# Patient Record
(demographics unavailable — no encounter records)

---

## 2024-10-22 NOTE — ASSESSMENT
[FreeTextEntry1] : This is a 74-year-old female patient, with PMHx of JAK2 Myeloproliferative disease, complicated by dural VT, PE, and Portal VT, seizures (on Keppra), CKD3 and HTN, who is presenting for a 10-week follow-up after doing sleep studies.  #Mild JAMIE - patient reports persistent snoring, daytime sleepiness, fatigue - no witnessed apnea episodes - uses Albuterol 2 times/week - sleep study showed mild JAMIE - will order CPAP machine and f/u in 3 months to assess compliance  #Cough - Resolved - resolved after Zyrtec and PPI - patient still on PPI  RTC in 3 months to assess compliance and improvement with CPAP machine.

## 2024-10-22 NOTE — REASON FOR VISIT
[Follow-Up] : a follow-up visit [Sleep Apnea] : sleep apnea [Family Member] : family member [TextBox_44] : Snoring, daytime sleepiness, fatigue, cough

## 2024-10-22 NOTE — REVIEW OF SYSTEMS
[Cough] : cough [A.M. Dry Mouth] : a.m. dry mouth [GERD] : gerd [Negative] : Endocrine [TextBox_30] : snoring at night

## 2024-10-22 NOTE — HISTORY OF PRESENT ILLNESS
[Former] : former [>= 20 pack years] : >= 20 pack years [Snoring] : snoring [TextBox_4] : This is a 74-year-old female patient, with PMHx of JAK2 Myeloproliferative disease, complicated by dural VT, PE, and Portal VT, seizures (on Keppra), CKD3 and HTN, who is presenting for follow-up for sleep study results. Patient reports persistent snoring at night, daytime sleepiness and fatigue. Her cough has significantly improved with Zyrtec and PPI. She still takes albuterol inhaler 1-2 times per week. Former smoker, quit more than 30 years ago. Patient lost 3 pounds since last visit. Denies any other respiratory symptoms, chest pain, palpitations.  [YearQuit] : 2009

## 2024-10-31 NOTE — HISTORY OF PRESENT ILLNESS
[FreeTextEntry1] : 73 yo F with PMHx of HTN, Pre DM, Myeloproliferative disorder, PE, DVT, PVD presenting for her annual physical. patient reports that she feel generally well. No complaints She is accompanied by her Daughter. patient needs assistance in iADLs. Her daughter does grocery shopping, cook and prepare meds for the patient. Confirmed with patient that the daughter is the healthcare proxy for which she has the paperwork. When asked about memory the daughter states that the patient is forgetful and that is why she spends most of the time at her apartment.  Patient wants to get a flu shot at this visit and wants her meds renewed. [Independent] : managing finances [] : Assistance needed with traveling/transport [Full assistance needed] : Assistance needed managing medications [Patient/Caregiver unclear of wishes] : , patient/caregiver unclear of wishes [Designated Healthcare Proxy] : Designated healthcare proxy [Relationship: ___] : Relationship: [unfilled] [AdvancecareDate] : 10/30/24

## 2024-10-31 NOTE — PHYSICAL EXAM
[Alert] : alert [No Acute Distress] : in no acute distress [Sclera] : the sclera and conjunctiva were normal [Normal Outer Ear/Nose] : the ears and nose were normal in appearance [Supple] : the neck was supple [No Respiratory Distress] : no respiratory distress [Auscultation Breath Sounds / Voice Sounds] : lungs were clear to auscultation bilaterally [Normal S1, S2] : normal S1 and S2 [Heart Rate And Rhythm] : heart rate was normal and rhythm regular [Edema] : edema was not present [Pedal Pulses Normal] : the pedal pulses are present [Bowel Sounds] : normal bowel sounds [Abdomen Tenderness] : non-tender [Abdomen Soft] : soft [No Spinal Tenderness] : no spinal tenderness [Involuntary Movements] : no involuntary movements were seen [Motor Tone] : muscle strength and tone were normal [No Focal Deficits] : no focal deficits [Oriented To Time, Place, And Person] : oriented to person, place, and time [de-identified] : impaired short-term memory

## 2024-10-31 NOTE — HISTORY OF PRESENT ILLNESS
[FreeTextEntry1] : 75 yo F with PMHx of HTN, Pre DM, Myeloproliferative disorder, PE, DVT, PVD presenting for her annual physical. patient reports that she feel generally well. No complaints She is accompanied by her Daughter. patient needs assistance in iADLs. Her daughter does grocery shopping, cook and prepare meds for the patient. Confirmed with patient that the daughter is the healthcare proxy for which she has the paperwork. When asked about memory the daughter states that the patient is forgetful and that is why she spends most of the time at her apartment.  Patient wants to get a flu shot at this visit and wants her meds renewed. [Independent] : managing finances [] : Assistance needed with traveling/transport [Full assistance needed] : Assistance needed managing medications [Patient/Caregiver unclear of wishes] : , patient/caregiver unclear of wishes [Designated Healthcare Proxy] : Designated healthcare proxy [Relationship: ___] : Relationship: [unfilled] [AdvancecareDate] : 10/30/24

## 2024-10-31 NOTE — ASSESSMENT
[FreeTextEntry1] : #Snoring #JAMIE - seen by pulm - BMI 33 - weight loss advised - pt awaiting CPAP  #HTN - daughter prepares food for pt and admits to using salt  - BP better compared to prior visit - c/w losartan 50mg in AM and 25mg in PM - c/w  HCTz  # Myeloproliferative disorder with dural vein thrombosis, PE and DVT #Macrocytosis - follows with Hematology visit - c/w Hydrea 1500 mg MWF and 1000 mg daily on other days  - Continue Coumadin, follows in coumadin clinic monthly - patient prefers coumadin over eliquis  #GERD - denies any symptoms - C/w omeprazole  #memory impairment  - pt needs assistance in iADLs - pt is cared for by her daughter who helps her in iADLs  #HCM - Mammogram 8/24/2023, birads 1- referral given - Colonoscopy 11/2023 multiple polyps, repeat in 3 years - Seen by GYN on 11/2023 - Flu shot 10/30/24 - Meds renewed RTC in 6 months

## 2024-10-31 NOTE — PHYSICAL EXAM
[Alert] : alert [No Acute Distress] : in no acute distress [Sclera] : the sclera and conjunctiva were normal [Normal Outer Ear/Nose] : the ears and nose were normal in appearance [Supple] : the neck was supple [No Respiratory Distress] : no respiratory distress [Auscultation Breath Sounds / Voice Sounds] : lungs were clear to auscultation bilaterally [Normal S1, S2] : normal S1 and S2 [Heart Rate And Rhythm] : heart rate was normal and rhythm regular [Edema] : edema was not present [Pedal Pulses Normal] : the pedal pulses are present [Bowel Sounds] : normal bowel sounds [Abdomen Tenderness] : non-tender [Abdomen Soft] : soft [No Spinal Tenderness] : no spinal tenderness [Involuntary Movements] : no involuntary movements were seen [Motor Tone] : muscle strength and tone were normal [No Focal Deficits] : no focal deficits [Oriented To Time, Place, And Person] : oriented to person, place, and time [de-identified] : impaired short-term memory

## 2024-12-18 NOTE — PHYSICAL EXAM
[Restricted in physically strenuous activity but ambulatory and able to carry out work of a light or sedentary nature] : Status 1- Restricted in physically strenuous activity but ambulatory and able to carry out work of a light or sedentary nature, e.g., light house work, office work [Obese] : obese [Normal] : affect appropriate [de-identified] : ambulating fine without a cane or walker

## 2024-12-18 NOTE — REASON FOR VISIT
[Follow-Up Visit] : a follow-up visit for [Myeloproliferative Disorder] : myeloproliferative disorder [FreeTextEntry2] : Dural Vein Thrombosis in setting of Jak2 Positive Myeloproliferative disorder

## 2024-12-18 NOTE — PHYSICAL EXAM
[Restricted in physically strenuous activity but ambulatory and able to carry out work of a light or sedentary nature] : Status 1- Restricted in physically strenuous activity but ambulatory and able to carry out work of a light or sedentary nature, e.g., light house work, office work [Obese] : obese [Normal] : affect appropriate [de-identified] : ambulating fine without a cane or walker

## 2024-12-18 NOTE — HISTORY OF PRESENT ILLNESS
[de-identified] : This is a very pleasant 66-year-old female who presents accompanied with her daughter Anyi Herrera  and gives some of the history. She has a history of portal vein thrombosis possibly provoked by intra-abdominal abscess for which she was treated in [facility] in 2011 although isn't unclear if indeed it was due to an infection of spontaneous. She states she finished 6 months of Coumadin and has been observed.  In September 26, 2016 she presented to [facility] hospital because of headaches and right-sided weakness. Workup that included a MRI of the brain on September 27 demonstrated Bilateral parieto-occipital lobe dural based masses along the posterior cerebral falx which may represent partially calcified meningiomas. Scattered T2 and FLAIR hyperintensities bilateral frontal and parietal periventricular and subcortical white matter which are nonspecific and without mass effect most likely consistent with chronic small vessel ischemic changes. No acute infarcts. She didn't want MRI brain and MR of the brain with contrast demonstrated A filling defect in the superior lobe sagittal sinus with deep extension to the tributary veins and the right transverse sinus. Deep venous system is patent. edema within the bilateral parietal lobes near the vertex with subarachnoid hemorrhage and intraparenchymal hemorrhage. There is mild leptomeningeal enhancement without nodular parenchymal enhancement. She was started on systemic anticoagulation and position to Coumadin during hospital course. She also had a seizure and was started on Keppra.  A hypercoagulable workup was done and demonstrated She was checked to positive. Her initial blood work on September 26, 2016 demonstrate whether to count of 10.8 hemoglobin of 15.2 and platelet count of 382,000 Without significant findings on differential.  She now presents for an elevation with her daughter. Since the episode she now lives with her daughter before she is completely independent. She does walk around at home but does require a walker because of balance issues as well as some residual weakness in the right side. She does have occasional dizziness. The headache that she initially had resolved. She continues to be in Coumadin 6 mg daily with last INR was therapeutic on January 3, 2016 when she presented to ED because she did not have Coumadin. Interestingly her platelet count and that CBC was elevated at 520,000. [de-identified] : 7/20/17 Last blood work had no elevations in Whites, reds or plts. Walking better. No bleeding. 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 No signal flow enhancement again noted in the confluence of sinuses extending to the right transverse sinus, sigmoid sinus and right internal jugular vein as well as slight irregularity of the superior sagittal sinus with decreased caliber and filling defect consistent with slow flow and previously described dural sinus thrombosis involving the superior sagittal sinus with extension to the right transverse sinus. She refused to have a bone marrow biopsy.  12/4/18 She is here for follow up. She is here with her sister. She started Hydrea in late October. She now has nail changes and darker skin with dry skin. She did not want to have pyelotomy. Most recent CBC shows HCT of about 55% with normal platelet and wbc. After a conversation we decided to increase Hydrea from 1000 mg daily to 1500 mg daily.   1/9/19 Patient is here for a follow-up visit with her home attendant. She is feeling well with no complaints. Most recent CBC shows decrease in her hemoglobin on 1500mg Hydrea daily, WBC are mildly decreased at 3.62, ANC is 2.18. The only side effect she reports is hyperpigmentation of her hands. She is still taking the Coumadin. Patient denies fever, chills, nausea, vomiting.  10/29/19 Pt presented today for routine f/u with her daughter. Reports feeling fine, missed many appointments and was last seen > 6 months ago. Reports that she continues taking Hydrea during that time and is also taking Coumadin with careful monitoring of INR, after last visit in January, she was recommended to take 1500 mg of Hydrea 3 days a week and a 1000 mg on other days. She has no new complaints except pain in her left hip that is more on lying down on that side and feels a little discomfort on ambulation. Denies any fevers, chills, swelling of legs, sob, blurry vision or any other symptoms.   2/26/20 She is here for follow up. She looks good. CBC is at goal no change needed.  3/8/21 She came back for follow up. She has been in hiatus due to COVID. She is doing well. Ambulates well. CBC is at goal from today and continues with Coumadin.   10/6/21 She is here for follow up. CBC from today is normal with stable macrocytosis. Most recent INR is 2. She has some back pain otherwise doing well. Uses a cane.  5/16/22 Pt returns for f/u today with her aid. She reports feeling fine except the headaches that have recently started. She reports having a dull headache on right side, tyelenol helps her but she is hesitant to take it. Denies any vision changes or difficulty in ambulating. Reports that this headache is different then the one she had in past. She is now ambulating without a walker or cane. Continues to take Hydrea as recommended and follows up coumadin center for monitoring of INR.  06/09/2023 She is here for follow up. She had Diverticulitis CTs from may review repeat had no abscess and has been on IV and PO antibiotics with variable INR follows in coumadin clinic. Otherwise doing well here with daughter and has no complaints.  12/11/2023 She is here for follow up. She was just in [facility] for a LEEP and colonoscopy and was on heparin rip than Lovenox to coumadin bridge. INR on 12/8 was at goal. Hypepalstic polyp on path. Cervical tissue at transformation zone showing focal superficial ulcer in background of low-grade squamous intraepithelial lesion, and mild chronic inflammation with nabothian cysts. No histologic evidence of high-grade squamous intraepithelial lesion identified. 2.  Endocervical curettings: -Benign endocervical tissue showing chronic inflammation and mucus material. CBC on 12/7 was at goal kidney function was stable as well. She is here with her sister. She feels well. We spoke about DOACS but decided to stay on coumadin.  06/13/2024 accompanied by her daughter; Reports feeling well at the baseline of her health status, no changes in chronic conditions or new complaints since the last visit. Reports INR is under control.  12/18/24 She is here for follow up.  She had a recent Cologuard test.  in August 2024 that was negative.  Most recent CBC was demonstrated hematocrit of 47.9% platelets and white cells were normal this was in April.  In April her creatinine was 1.6 with GFR of 34 this is relatively stable since May 2022 although repeat creatinine was granted it was 1.4 than Hct today is  46.6

## 2024-12-18 NOTE — END OF VISIT
[FreeTextEntry3] : I was physically present for the key portions of the evaluation and management service provided.  I agree with the history and physical, and plan which I have reviewed and edited where appropriate.  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  She remains on Hydrea and Coumadin we did speak about switching to DOAC's but decided to hold off now.  CBC is stable she will see us back in 6 months

## 2024-12-18 NOTE — REVIEW OF SYSTEMS
[Joint Pain] : joint pain [Negative] : Allergic/Immunologic [FreeTextEntry9] : Left hip pain - uses cane

## 2024-12-18 NOTE — ASSESSMENT
[FreeTextEntry1] : # Portal vein thrombosis - Dural Vein Thrombosis and other venous events. due to Pvera - in 2011 unclear if it was due to intra-abdominal infection or unprovoked, Resulting in Splenomegaly with gastroesophageal perihepatic and perigastric varices, were noted suggestive of sequela with portal hypertension and possible portal vein occlusion and recanalization, as well as DVT and pulmonary embolism for which she was on Coumadin for 6 months who presented with severe headaches right-sided weakness and was found to have Dural vein thrombosis and seizure with hypercoagulable workup Demonstrated judith 2 positivity Suspicious for myeloproliferative disorder. - Etiology likely due myeloproliferative disorder given the Jak2 Positivity and developed sustained Erythrocytosis which resolved with Hydrea. - she refused bone marrow biopsy. - 05/2023 CT - Splenomegaly measuring up to 16 cm in AP dimension with few wedge-shaped peripheral hypodense foci, unchanged since July 19, 2018 worsening inflammatory changes of acute sigmoid diverticulitis without evidence for abscess formation or intraperitoneal free air. - 11/2023 colonoscopy. 2024 negative Cologuard   # Seizures possibly related to #1 on Keppra in past - no new events reported.   #Has CKD  PLAN: - continue Hydrea 1500 mg MWF and 1000 mg all other days.  Since hematocrit is still elevated decided to increase Hydrea to 1500 mg Monday through Friday and 1000 mg Saturday and Sunday.  She takes her medication as directed MCV is elevated she does have some craving for ice -goal HCT is <42% - continue indefinite anticoagulation with Coumadin unless bleeding becomes an issues - being followed by Coumadin clinic goal INR is 2-3. - she continues to decline Bone Marrow Biopsy. - F/U GI for portal hypertension and gastroesophageal perihepatic and perigastric varices. - Labs today: CBC RTC 1 month we will check CBC CMP and iron studies since been craving more ice and we will go from there

## 2024-12-18 NOTE — ASSESSMENT
[Verbal] : verbal [Patient] : patient [Good - alert, interested, motivated] : Good - alert, interested, motivated [Demonstrates independently] : demonstrates independently [FreeTextEntry1] : Plan: -Pt seen & evaluated -Patient has pain about the toes secondary to nail pressure and relates improvement with periodic debridement -Aseptic debridement of all fungal nails 1-10 performed w/sterile nail nipper to normotrophic thickness & length -Continue ciclopirox 8% for fungal nails. -RTC 3 months

## 2024-12-18 NOTE — PHYSICAL EXAM
[General Appearance - Alert] : alert [General Appearance - In No Acute Distress] : in no acute distress [Ankle Swelling Bilaterally] : bilaterally  [2+] : left foot dorsalis pedis 2+ [Pes Planus] : pes planus deformity [Sensation] : the sensory exam was normal to light touch and pinprick [Ankle Swelling (On Exam)] : not present [Varicose Veins Of Lower Extremities] : not present [] : not present [Delayed in the Right Toes] : capillary refills normal in right toes [Delayed in the Left Toes] : capillary refills normal in the left toes [Foot Ulcer] : no foot ulcer [Skin Induration] : no skin induration [FreeTextEntry1] : Elongated mycotic nails x10 Mild xerosis to plantar feet

## 2024-12-18 NOTE — HISTORY OF PRESENT ILLNESS
[de-identified] : This is a very pleasant 66-year-old female who presents accompanied with her daughter Anyi Herrera  and gives some of the history. She has a history of portal vein thrombosis possibly provoked by intra-abdominal abscess for which she was treated in [facility] in 2011 although isn't unclear if indeed it was due to an infection of spontaneous. She states she finished 6 months of Coumadin and has been observed.  In September 26, 2016 she presented to [facility] because of headaches and right-sided weakness. Workup that included a MRI of the brain on September 27 demonstrated Bilateral parieto-occipital lobe dural based masses along the posterior cerebral falx which may represent partially calcified meningiomas. Scattered T2 and FLAIR hyperintensities bilateral frontal and parietal periventricular and subcortical white matter which are nonspecific and without mass effect most likely consistent with chronic small vessel ischemic changes. No acute infarcts. She didn't want MRI brain and MR of the brain with contrast demonstrated A filling defect in the superior lobe sagittal sinus with deep extension to the tributary veins and the right transverse sinus. Deep venous system is patent. edema within the bilateral parietal lobes near the vertex with subarachnoid hemorrhage and intraparenchymal hemorrhage. There is mild leptomeningeal enhancement without nodular parenchymal enhancement. She was started on systemic anticoagulation and position to Coumadin during hospital course. She also had a seizure and was started on Keppra.  A hypercoagulable workup was done and demonstrated She was checked to positive. Her initial blood work on September 26, 2016 demonstrate whether to count of 10.8 hemoglobin of 15.2 and platelet count of 382,000 Without significant findings on differential.  She now presents for an elevation with her daughter. Since the episode she now lives with her daughter before she is completely independent. She does walk around at home but does require a walker because of balance issues as well as some residual weakness in the right side. She does have occasional dizziness. The headache that she initially had resolved. She continues to be in Coumadin 6 mg daily with last INR was therapeutic on January 3, 2016 when she presented to ED because she did not have Coumadin. Interestingly her platelet count and that CBC was elevated at 520,000. [de-identified] : 7/20/17 Last blood work had no elevations in Whites, reds or plts. Walking better. No bleeding. 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 No signal flow enhancement again noted in the confluence of sinuses extending to the right transverse sinus, sigmoid sinus and right internal jugular vein as well as slight irregularity of the superior sagittal sinus with decreased caliber and filling defect consistent with slow flow and previously described dural sinus thrombosis involving the superior sagittal sinus with extension to the right transverse sinus. She refused to have a bone marrow biopsy.  12/4/18 She is here for follow up. She is here with her sister. She started Hydrea in late October. She now has nail changes and darker skin with dry skin. She did not want to have pyelotomy. Most recent CBC shows HCT of about 55% with normal platelet and wbc. After a conversation we decided to increase Hydrea from 1000 mg daily to 1500 mg daily.   1/9/19 Patient is here for a follow-up visit with her home attendant. She is feeling well with no complaints. Most recent CBC shows decrease in her hemoglobin on 1500mg Hydrea daily, WBC are mildly decreased at 3.62, ANC is 2.18. The only side effect she reports is hyperpigmentation of her hands. She is still taking the Coumadin. Patient denies fever, chills, nausea, vomiting.  10/29/19 Pt presented today for routine f/u with her daughter. Reports feeling fine, missed many appointments and was last seen > 6 months ago. Reports that she continues taking Hydrea during that time and is also taking Coumadin with careful monitoring of INR, after last visit in January, she was recommended to take 1500 mg of Hydrea 3 days a week and a 1000 mg on other days. She has no new complaints except pain in her left hip that is more on lying down on that side and feels a little discomfort on ambulation. Denies any fevers, chills, swelling of legs, sob, blurry vision or any other symptoms.   2/26/20 She is here for follow up. She looks good. CBC is at goal no change needed.  3/8/21 She came back for follow up. She has been in hiatus due to COVID. She is doing well. Ambulates well. CBC is at goal from today and continues with Coumadin.   10/6/21 She is here for follow up. CBC from today is normal with stable macrocytosis. Most recent INR is 2. She has some back pain otherwise doing well. Uses a cane.  5/16/22 Pt returns for f/u today with her aid. She reports feeling fine except the headaches that have recently started. She reports having a dull headache on right side, tyelenol helps her but she is hesitant to take it. Denies any vision changes or difficulty in ambulating. Reports that this headache is different then the one she had in past. She is now ambulating without a walker or cane. Continues to take Hydrea as recommended and follows up coumadin center for monitoring of INR.  06/09/2023 She is here for follow up. She had Diverticulitis CTs from may review repeat had no abscess and has been on IV and PO antibiotics with variable INR follows in coumadin clinic. Otherwise doing well here with daughter and has no complaints.  12/11/2023 She is here for follow up. She was just in [facility] for a LEEP and colonoscopy and was on heparin rip than Lovenox to coumadin bridge. INR on 12/8 was at goal. Hypepalstic polyp on path. Cervical tissue at transformation zone showing focal superficial ulcer in background of low-grade squamous intraepithelial lesion, and mild chronic inflammation with nabothian cysts. No histologic evidence of high-grade squamous intraepithelial lesion identified. 2.  Endocervical curettings: -Benign endocervical tissue showing chronic inflammation and mucus material. CBC on 12/7 was at goal kidney function was stable as well. She is here with her sister. She feels well. We spoke about DOACS but decided to stay on coumadin.  06/13/2024 accompanied by her daughter; Reports feeling well at the baseline of her health status, no changes in chronic conditions or new complaints since the last visit. Reports INR is under control.  12/18/24 She is here for follow up.  She had a recent Cologuard test.  in August 2024 that was negative.  Most recent CBC was demonstrated hematocrit of 47.9% platelets and white cells were normal this was in April.  In April her creatinine was 1.6 with GFR of 34 this is relatively stable since May 2022 although repeat creatinine was granted it was 1.4 than Hct today is  46.6

## 2024-12-18 NOTE — HISTORY OF PRESENT ILLNESS
[Other: ____] : [unfilled] [FreeTextEntry1] : -74F returns for med management of nail dystrophy  -Has been applying ciclopirox daily with improvement noted -Prediabetic -Denies numbness and tingling

## 2024-12-18 NOTE — BEGINNING OF VISIT
[0] : 2) Feeling down, depressed, or hopeless: Not at all (0) [PHQ-2 Negative] : PHQ-2 Negative [Pain Scale: ___] : On a scale of 1-10, today the patient's pain is a(n) [unfilled]. [Former] : Former [Date Discussed (MM/DD/YY): ___] : Discussed: [unfilled] [Patient/Caregiver not ready to engage] : Patient/Caregiver not ready to engage [Abdominal Pain] : no abdominal pain [Vomiting] : no vomiting [Constipation] : no constipation

## 2025-01-21 NOTE — ASSESSMENT
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 Lindquist, neuropathy, seizure disorder, peripheral vascular disease and hearing loss is presenting for an initial evaluation of CKD 3b. Patient's most recent BUN/Cr is 28/1.6 from 4/2024. eGFR 34, Pr/Cr 0.6 in 4/24 .  #CKD 3b/4 likely 2/2 hypercoagulable state vs HTN - GFR 34 on CMP and 27 with cystatin-c in april 2024, was 41 and 29 previously - cr 1.6 in april 24, was 1.36 and 1.8 previously - prot/creat ratio 0.6 and albumin to creat ratio 243 - pt did not ge any labs this visit either - Repeat labs before next visit - polyuria, did not get US renal bladder - C/w losartan to 50mg in Am and 25mg in pm - avoid nephrotoxic agents  #HTN - /80 in office today - c/w HCTZ 25mg qd  #preDM - A1c 5.8 april 24, stable - diet and lifestyle modification advised  - Pt taking losartan 50mg in the am and 25mg in the pm RTC in 3-4 months with A1c, CMP, CBC, B12, vit d, phos, pth, UA and reflex Ucx, US renal

## 2025-01-21 NOTE — END OF VISIT
[] : Resident [FreeTextEntry3] : Patient seen and examined with resident, chart reviewed.  No uremic symptoms noted on encounter today, does not some incontinence of her urine, has not yet performed the RBUS.  No new labwork available for review today.  BP controlled on encounter with adjustment in Losartan dose.  Advised patient to performed assigned labwork in 1-2 months in addition to her renal imaging, and to RTC in 3 months for further assessment.   [Time Spent: ___ minutes] : I have spent [unfilled] minutes of time on the encounter which excludes teaching and separately reported services.

## 2025-01-21 NOTE — HISTORY OF PRESENT ILLNESS
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 The patient has chronic kidney disease stage 3 and the etiology is undetermined. The patient is experiencing the following symptoms: polyuria and edema. Current Treatment includes angiotensin receptor blocker and diuretics.  By report, patient demonstrates good compliance, good tolerance and fair symptom control with treatment.  The last visit was in Aug 2024 and US renal bladder was ordered which was not completed on this visit. Repeat labs were ordered on the last visit.

## 2025-01-21 NOTE — PHYSICAL EXAM
[General Appearance - Alert] : alert [General Appearance - In No Acute Distress] : in no acute distress [Sclera] : the sclera and conjunctiva were normal [PERRL With Normal Accommodation] : pupils were equal in size, round, and reactive to light [Extraocular Movements] : extraocular movements were intact [Outer Ear] : the ears and nose were normal in appearance [Oropharynx] : the oropharynx was normal [Neck Appearance] : the appearance of the neck was normal [Neck Cervical Mass (___cm)] : no neck mass was observed [Jugular Venous Distention Increased] : there was no jugular-venous distention [Thyroid Diffuse Enlargement] : the thyroid was not enlarged [Thyroid Nodule] : there were no palpable thyroid nodules [Auscultation Breath Sounds / Voice Sounds] : lungs were clear to auscultation bilaterally [Heart Rate And Rhythm] : heart rate was normal and rhythm regular [Heart Sounds] : normal S1 and S2 [Heart Sounds Gallop] : no gallops [Murmurs] : no murmurs [Heart Sounds Pericardial Friction Rub] : no pericardial rub [Full Pulse] : the pedal pulses are present [Edema] : there was no peripheral edema [Bowel Sounds] : normal bowel sounds [Abdomen Soft] : soft [Abdomen Tenderness] : non-tender [Abdomen Mass (___ Cm)] : no abdominal mass palpated [Abnormal Walk] : normal gait [Nail Clubbing] : no clubbing  or cyanosis of the fingernails [Musculoskeletal - Swelling] : no joint swelling seen [Motor Tone] : muscle strength and tone were normal [Skin Color & Pigmentation] : normal skin color and pigmentation [Skin Turgor] : normal skin turgor [] : no rash [Oriented To Time, Place, And Person] : oriented to person, place, and time [Impaired Insight] : insight and judgment were intact [Affect] : the affect was normal

## 2025-01-23 NOTE — ASSESSMENT
[FreeTextEntry1] : # Portal vein thrombosis - Dural Vein Thrombosis and other venous events. due to Pvera - in 2011 unclear if it was due to intra-abdominal infection or unprovoked, Resulting in Splenomegaly with gastroesophageal perihepatic and perigastric varices, were noted suggestive of sequela with portal hypertension and possible portal vein occlusion and recanalization, as well as DVT and pulmonary embolism for which she was on Coumadin for 6 months who presented with severe headaches right-sided weakness and was found to have Dural vein thrombosis and seizure with hypercoagulable workup Demonstrated judith 2 positivity Suspicious for myeloproliferative disorder. - Etiology likely due myeloproliferative disorder given the Jak2 Positivity and developed sustained Erythrocytosis which resolved with Hydrea. - she refused bone marrow biopsy. - 05/2023 CT - Splenomegaly measuring up to 16 cm in AP dimension with few wedge-shaped peripheral hypodense foci, unchanged since July 19, 2018 worsening inflammatory changes of acute sigmoid diverticulitis without evidence for abscess formation or intraperitoneal free air. - 11/2023 colonoscopy. 2024 negative Cologuard   # Seizures possibly related to #1 on Keppra in past - no new events reported.   #Has CKD  PLAN: -on Hydrea to 1500 mg Monday through Friday and 1000 mg Saturday and Sunday.  She takes her medication as directed MCV is elevated she does have some craving for ice -goal HCT is <42%  HCT was still 45% so will increase Hydrea to 1500 mg daily - continue indefinite anticoagulation with Coumadin unless bleeding becomes an issues - being followed by Coumadin clinic goal INR is 2-3. - she continues to decline Bone Marrow Biopsy. - F/U GI for portal hypertension and gastroesophageal perihepatic and perigastric varices. - Labs today: CBC RTC  2 months for follow up.

## 2025-01-23 NOTE — HISTORY OF PRESENT ILLNESS
[de-identified] : This is a very pleasant 66-year-old female who presents accompanied with her daughter Anyi Herrera  and gives some of the history. She has a history of portal vein thrombosis possibly provoked by intra-abdominal abscess for which she was treated in [facility] in 2011 although isn't unclear if indeed it was due to an infection of spontaneous. She states she finished 6 months of Coumadin and has been observed.  In September 26, 2016 she presented to [facility] hospital because of headaches and right-sided weakness. Workup that included a MRI of the brain on September 27 demonstrated Bilateral parieto-occipital lobe dural based masses along the posterior cerebral falx which may represent partially calcified meningiomas. Scattered T2 and FLAIR hyperintensities bilateral frontal and parietal periventricular and subcortical white matter which are nonspecific and without mass effect most likely consistent with chronic small vessel ischemic changes. No acute infarcts. She didn't want MRI brain and MR of the brain with contrast demonstrated A filling defect in the superior lobe sagittal sinus with deep extension to the tributary veins and the right transverse sinus. Deep venous system is patent. edema within the bilateral parietal lobes near the vertex with subarachnoid hemorrhage and intraparenchymal hemorrhage. There is mild leptomeningeal enhancement without nodular parenchymal enhancement. She was started on systemic anticoagulation and position to Coumadin during hospital course. She also had a seizure and was started on Keppra.  A hypercoagulable workup was done and demonstrated She was checked to positive. Her initial blood work on September 26, 2016 demonstrate whether to count of 10.8 hemoglobin of 15.2 and platelet count of 382,000 Without significant findings on differential.  She now presents for an elevation with her daughter. Since the episode she now lives with her daughter before she is completely independent. She does walk around at home but does require a walker because of balance issues as well as some residual weakness in the right side. She does have occasional dizziness. The headache that she initially had resolved. She continues to be in Coumadin 6 mg daily with last INR was therapeutic on January 3, 2016 when she presented to ED because she did not have Coumadin. Interestingly her platelet count and that CBC was elevated at 520,000. [de-identified] : 7/20/17 Last blood work had no elevations in Whites, reds or plts. Walking better. No bleeding. 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 No signal flow enhancement again noted in the confluence of sinuses extending to the right transverse sinus, sigmoid sinus and right internal jugular vein as well as slight irregularity of the superior sagittal sinus with decreased caliber and filling defect consistent with slow flow and previously described dural sinus thrombosis involving the superior sagittal sinus with extension to the right transverse sinus. She refused to have a bone marrow biopsy.  12/4/18 She is here for follow up. She is here with her sister. She started Hydrea in late October. She now has nail changes and darker skin with dry skin. She did not want to have pyelotomy. Most recent CBC shows HCT of about 55% with normal platelet and wbc. After a conversation we decided to increase Hydrea from 1000 mg daily to 1500 mg daily.   1/9/19 Patient is here for a follow-up visit with her home attendant. She is feeling well with no complaints. Most recent CBC shows decrease in her hemoglobin on 1500mg Hydrea daily, WBC are mildly decreased at 3.62, ANC is 2.18. The only side effect she reports is hyperpigmentation of her hands. She is still taking the Coumadin. Patient denies fever, chills, nausea, vomiting.  10/29/19 Pt presented today for routine f/u with her daughter. Reports feeling fine, missed many appointments and was last seen > 6 months ago. Reports that she continues taking Hydrea during that time and is also taking Coumadin with careful monitoring of INR, after last visit in January, she was recommended to take 1500 mg of Hydrea 3 days a week and a 1000 mg on other days. She has no new complaints except pain in her left hip that is more on lying down on that side and feels a little discomfort on ambulation. Denies any fevers, chills, swelling of legs, sob, blurry vision or any other symptoms.   2/26/20 She is here for follow up. She looks good. CBC is at goal no change needed.  3/8/21 She came back for follow up. She has been in hiatus due to COVID. She is doing well. Ambulates well. CBC is at goal from today and continues with Coumadin.   10/6/21 She is here for follow up. CBC from today is normal with stable macrocytosis. Most recent INR is 2. She has some back pain otherwise doing well. Uses a cane.  5/16/22 Pt returns for f/u today with her aid. She reports feeling fine except the headaches that have recently started. She reports having a dull headache on right side, tyelenol helps her but she is hesitant to take it. Denies any vision changes or difficulty in ambulating. Reports that this headache is different then the one she had in past. She is now ambulating without a walker or cane. Continues to take Hydrea as recommended and follows up coumadin center for monitoring of INR.  06/09/2023 She is here for follow up. She had Diverticulitis CTs from may review repeat had no abscess and has been on IV and PO antibiotics with variable INR follows in coumadin clinic. Otherwise doing well here with daughter and has no complaints.  12/11/2023 She is here for follow up. She was just in [facility] for a LEEP and colonoscopy and was on heparin rip than Lovenox to coumadin bridge. INR on 12/8 was at goal. Hypepalstic polyp on path. Cervical tissue at transformation zone showing focal superficial ulcer in background of low-grade squamous intraepithelial lesion, and mild chronic inflammation with nabothian cysts. No histologic evidence of high-grade squamous intraepithelial lesion identified. 2.  Endocervical curettings: -Benign endocervical tissue showing chronic inflammation and mucus material. CBC on 12/7 was at goal kidney function was stable as well. She is here with her sister. She feels well. We spoke about DOACS but decided to stay on coumadin.  06/13/2024 accompanied by her daughter; Reports feeling well at the baseline of her health status, no changes in chronic conditions or new complaints since the last visit. Reports INR is under control.  12/18/24 She is here for follow up.  She had a recent Cologuard test.  in August 2024 that was negative.  Most recent CBC was demonstrated hematocrit of 47.9% platelets and white cells were normal this was in April.  In April her creatinine was 1.6 with GFR of 34 this is relatively stable since May 2022 although repeat creatinine was granted it was 1.4 than Hct today is  46.6  1/23/25 She is here for follow-up.  Hematocrit today is 45% white blood cell count is normal platelets are normal

## 2025-03-17 NOTE — END OF VISIT
[Resident] : Resident [] : Resident [FreeTextEntry3] : -Rx ciclopirox 8% for fungal nails. [FreeTextEntry2] : -nails debrided to patients tolerance

## 2025-03-17 NOTE — HISTORY OF PRESENT ILLNESS
[Other: ____] : [unfilled] [FreeTextEntry1] : -75F returns for med management of nail dystrophy  -Has been applying ciclopirox daily with improvement noted -Prediabetic -Denies numbness and tingling  03/17- Patient presents to clinic for routine nail care

## 2025-03-17 NOTE — ASSESSMENT
[Verbal] : verbal [Patient] : patient [Good - alert, interested, motivated] : Good - alert, interested, motivated [Demonstrates independently] : demonstrates independently [FreeTextEntry1] : Assessment: Onychomycosis  Plan: -Pt seen & evaluated -Patient has pain about the toes secondary to nail pressure and relates improvement with periodic debridement -Aseptic debridement of all fungal nails 1-10 performed w/sterile nail nipper to normotrophic thickness & length -Continue ciclopirox 8% for fungal nails. -RTC 3 months

## 2025-03-17 NOTE — END OF VISIT
[Resident] : Resident [] : Resident [FreeTextEntry3] : -Rx ciclopirox 8% for fungal nails. [FreeTextEntry2] : -nails debrided to patients tolerance No

## 2025-03-17 NOTE — PHYSICAL EXAM
[General Appearance - Alert] : alert [General Appearance - In No Acute Distress] : in no acute distress [Ankle Swelling Bilaterally] : bilaterally  [2+] : left foot dorsalis pedis 2+ [Pes Planus] : pes planus deformity [Sensation] : the sensory exam was normal to light touch and pinprick [Ankle Swelling (On Exam)] : not present [Varicose Veins Of Lower Extremities] : not present [] : not present [Delayed in the Right Toes] : capillary refills normal in right toes [Delayed in the Left Toes] : capillary refills normal in the left toes [de-identified] : Normal strength 5/5 b/l [Foot Ulcer] : no foot ulcer [Skin Induration] : no skin induration [FreeTextEntry1] : Elongated mycotic nails x10 Mild xerosis to plantar feet

## 2025-03-25 NOTE — ASSESSMENT
[FreeTextEntry1] : This is a 74-year-old female patient, with PMHx of JAK2 Myeloproliferative disease, complicated by dural VT, PE, and Portal VT, seizures (on Keppra), CKD3 and HTN, who is presenting for a 10-week follow-up after doing sleep studies.  #Mild JAMIE - patient reports persistent snoring, daytime sleepiness, fatigue - uses Albuterol 2 times/week - sleep study showed mild JAMIE - Requesting face mask instead of nasal mask  - Per patient's family, she is using it every day, but per compliance report, she is only using it 57% of the time  #Cough - recurrent - resolved after Zyrtec and PPI - patient recently not taking PPI, will reorder  RTC in 3 months to assess compliance and improvement with CPAP machine.

## 2025-03-25 NOTE — HISTORY OF PRESENT ILLNESS
[Former] : former [>= 20 pack years] : >= 20 pack years [Snoring] : snoring [TextBox_4] : 75-year-old female patient, with PMHx of JAK2 Myeloproliferative disease, complicated by dural VT, PE, and Portal VT, seizures (on Keppra), CKD3 and HTN, who is presenting for follow-up for follow up.  [YearQuit] : 2009

## 2025-03-25 NOTE — REASON FOR VISIT
[Follow-Up] : a follow-up visit [Sleep Apnea] : sleep apnea [Family Member] : family member [Cough] : cough [TextBox_44] : Snoring, daytime sleepiness, fatigue, cough

## 2025-06-03 NOTE — END OF VISIT
[] : Resident [FreeTextEntry3] :  Patient seen and examined with resident, chart reviewed.  No LUTS or uremic symptoms noted on encounter today.  Renal function stable though proteinuria uncontrolled, will increase to Losartan 50mg BID for better control.  BP well controlled on clinic readings today.  RTC in 4-6 months with labwork 2-4 weeks prior. [Time Spent: ___ minutes] : I have spent [unfilled] minutes of time on the encounter which excludes teaching and separately reported services.

## 2025-06-03 NOTE — REVIEW OF SYSTEMS
[Fever] : no fever [Eye Pain] : no eye pain [Eyes Itch] : no itching of the eyes [Nasal Discharge] : no nasal discharge [Sore Throat] : no sore throat [Chest Pain] : no chest pain [Palpitations] : no palpitations [Lower Ext Edema] : no lower extremity edema [Cough] : no cough [SOB on Exertion] : no shortness of breath during exertion [Abdominal Pain] : no abdominal pain [Vomiting] : no vomiting [Diarrhea] : no diarrhea [Dysuria] : no dysuria [Joint Swelling] : no joint swelling [Skin Lesions] : no skin lesions [Skin Wound] : no skin wound

## 2025-06-03 NOTE — PHYSICAL EXAM
[General Appearance - Alert] : alert [General Appearance - In No Acute Distress] : in no acute distress [Extraocular Movements] : extraocular movements were intact [Outer Ear] : the ears and nose were normal in appearance [Jugular Venous Distention Increased] : there was no jugular-venous distention [] : no respiratory distress [Respiration, Rhythm And Depth] : normal respiratory rhythm and effort [Auscultation Breath Sounds / Voice Sounds] : lungs were clear to auscultation bilaterally [Apical Impulse] : the apical impulse was normal [Heart Sounds] : normal S1 and S2 [Edema] : there was no peripheral edema [Abdomen Soft] : soft [Abdomen Tenderness] : non-tender [No CVA Tenderness] : no ~M costovertebral angle tenderness

## 2025-06-03 NOTE — HISTORY OF PRESENT ILLNESS
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  The patient has chronic kidney disease stage 3. On this visit, patient is not complaining of any dysuria and did not report any LE swelling. Compliant with her hydrochlorothiazide and Losartan 50 in morning and 25 in the evening  By report, patient demonstrates good compliance, good tolerance and fair symptom control with treatment. She performed her kidney bladder US which did not show any structural problem

## 2025-06-03 NOTE — ASSESSMENT
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 Lindquist, neuropathy, seizure disorder, peripheral vascular disease and hearing loss is presenting for follow up of CKD 3b. P  #CKD 3b/4 likely 2/2 hypercoagulable state vs HTN - Labs on 04/24 BUN/Cr is 28/1.6 from 4/2024. eGFR 34, Pr/Cr 0.6  - cystatin-c in april 2024, was 41 and 29 previously - prot/creat ratio 0.6  - albumin to creat ratio from 243 --> 497 on April 2025 - US renal bladder shows no hydronephrosis, no kidney stone, bladder and kidney size within normal limit - c/w Hydrochlorothiazide and Increase Losartan to 50 mg BID - avoid nephrotoxic agents  #HTN - /76 in office today - c/w HCTZ and Losartan  #preDM - A1c 5.8 april 24 --> 5.6 on April 2025 - lipid profile show adequate control with ldl 80 - diet and lifestyle modification advised  RTC in 5 months with labs

## 2025-07-11 NOTE — REVIEW OF SYSTEMS
[Fever] : no fever [Night Sweats] : no night sweats [Discharge] : no discharge [Vision Problems] : no vision problems [Nasal Discharge] : no nasal discharge [Chest Pain] : no chest pain [Orthopnea] : no orthopnea [Shortness Of Breath] : no shortness of breath [Abdominal Pain] : no abdominal pain [Vomiting] : no vomiting [Dysuria] : no dysuria [Hematuria] : no hematuria [Joint Pain] : no joint pain [Muscle Pain] : no muscle pain [Itching] : no itching [Skin Rash] : no skin rash [Headache] : no headache [Memory Loss] : no memory loss [Suicidal] : not suicidal

## 2025-07-11 NOTE — HISTORY OF PRESENT ILLNESS
[FreeTextEntry1] : Follow-Up. [de-identified] : 76 yo F with PMHx of HTN, Pre DM, Myeloproliferative disorder, PE, DVT, PVD, CKD3 presenting for follow up.  Patient last seen in clinic 04/25: endorses regular use of her CPAP for JAMIE, continues to follow with Coumadin clinic for MDS c/b portal vein thrombosis/dural vein thrombosis/PE and DVT.  Today, patient is without acute issues.

## 2025-07-11 NOTE — ASSESSMENT
[FreeTextEntry1] : 74 yo F with PMHx of HTN, Pre DM, Myeloproliferative disorder, PE, DVT, PVD presenting for follow up.  #Myeloproliferative disorder, suspect PCV (refused BMB) #PVT/Dural Vein Thrombosis/PE/DVT on coumadin #Macrocytosis - follows with Hematology (Dr. Mejia) - on Hydrea, now 1,500mg daily - goal Hct <42% - Continue Coumadin, follows in coumadin clinic monthly, goal INR 2-3 - GI referral for portal htn and  gastroesophageal perihepatic and perigastric varices  #CKD3b, likely 2/2 hypercoagulable state vs. HTN - labwork 04/25 with worsening albumin:Cr ratio - RBUS: no hydro, bladder size wnl - follows with Nephro (Dr. Bran): losartan 50mg now BID, c/w hctz  - avoid nephrotoxic meds  #HTN - c/w losartan and HCTZ as above  #JAMIE - follows w/ Pulm - uses CPAP  #GERD - denies any symptoms - C/w omeprazole  #memory impairment - pt needs assistance in iADLs - pt is cared for by her daughter who helps her in iADLs  #HCM - Mammogram 06/25: BiRADS1. can stop screening given age - Colonoscopy 11/2023 multiple polyps, repeat in 3 year - ASCVD risk reduction: pt is very active, c/w regular ambulation  RTC in 6 months with repeat lab work

## 2025-07-11 NOTE — PHYSICAL EXAM
[No Acute Distress] : no acute distress [Well Nourished] : well nourished [Normal Sclera/Conjunctiva] : normal sclera/conjunctiva [Normal Outer Ear/Nose] : the outer ears and nose were normal in appearance [No JVD] : no jugular venous distention [No Respiratory Distress] : no respiratory distress  [Normal Rate] : normal rate  [No Carotid Bruits] : no carotid bruits [No Edema] : there was no peripheral edema [Soft] : abdomen soft [No HSM] : no HSM [Normal Anterior Cervical Nodes] : no anterior cervical lymphadenopathy [No CVA Tenderness] : no CVA  tenderness [No Joint Swelling] : no joint swelling [No Rash] : no rash [Coordination Grossly Intact] : coordination grossly intact [Normal Insight/Judgement] : insight and judgment were intact

## 2025-07-29 NOTE — HISTORY OF PRESENT ILLNESS
[Former] : former [TextBox_4] : 75-year-old female patient, with PMHx of JAK2 Myeloproliferative disease, complicated by dural VT, PE, and Portal VT, seizures (on Keppra), CKD3 and HTN, who is presenting for follow-up for follow up.    Here for Cough ; chronic; more during the morning ; increased phlegm production during the AM. Uses CPAP regularly at night. Feels the CPAP is helping her with the JAMIE symptoms . Has been having cough for years.      [TextBox_11] : 1 [YearQuit] : 2009

## 2025-07-29 NOTE — ASSESSMENT
[FreeTextEntry1] : This is a 74-year-old female patient, with PMHx of JAK2 Myeloproliferative disease, complicated by dural VT, PE, and Portal VT, seizures (on Keppra), CKD3 and HTN, who is presenting for a 10-week follow-up after doing sleep studies.  #Chronic Cough; Likely postnasal drip  - already on PPI; will order Flonase 1 puff once a day (Take when the symptoms are worse) - Also, will do Loratadine 10 mg at bedtime.  - Continue CPAP for JAMIE.  - If no improvement with UACS treatment, will likely need chest imaging - Follow up 3 months.   #Mild JAMIE - patient reports persistent snoring, daytime sleepiness, fatigue - uses Albuterol 2 times/week - sleep study showed mild JAMIE - Mentions that she uses the CPAP regularly. Continue.  - Compliance report 93%   RTC in 3 months to assess symptoms improvement